# Patient Record
Sex: MALE | Race: WHITE
[De-identification: names, ages, dates, MRNs, and addresses within clinical notes are randomized per-mention and may not be internally consistent; named-entity substitution may affect disease eponyms.]

---

## 2017-02-23 ENCOUNTER — HOSPITAL ENCOUNTER (OUTPATIENT)
Dept: HOSPITAL 47 - RADPROMAIN | Age: 77
Discharge: HOME | End: 2017-02-23
Payer: MEDICARE

## 2017-02-23 DIAGNOSIS — C85.90: Primary | ICD-10-CM

## 2017-02-23 LAB
BUN SERPL-SCNC: 21 MG/DL (ref 9–20)
NON-AFRICAN AMERICAN GFR(MDRD): >60

## 2017-02-23 PROCEDURE — 82565 ASSAY OF CREATININE: CPT

## 2017-02-23 PROCEDURE — 74177 CT ABD & PELVIS W/CONTRAST: CPT

## 2017-02-23 PROCEDURE — 84520 ASSAY OF UREA NITROGEN: CPT

## 2017-02-23 PROCEDURE — 71260 CT THORAX DX C+: CPT

## 2017-02-23 NOTE — CT
EXAMINATION TYPE: CT ChestAbdPelvis w con

 

DATE OF EXAM: 2/23/2017 4:35 PM

 

COMPARISON: CT cap November 14, 2016.

 

HISTORY: Follow up Non-Hodgkins lymphoma

 

CT DLP: 2037 mGycm. Automated Exposure Control for Dose Reduction was Utilized.

 

 

CONTRAST: 

CT scan of the thorax, abdomen and pelvis is performed with oral and with IV Contrast, patient inject
ed with 100 mL of Omnipaque 300.

 

FINDINGS:

 

LUNGS: The lungs are grossly clear, there is no concerning parenchymal mass or nodule identified.   T
here is no pleural effusion or pneumothorax seen.  The tracheobronchial tree is patent.

 

MEDIASTINUM: There are no greater than 1 cm hilar or mediastinal lymph nodes.   No cardiomegaly is se
en.  There is stable tiny pericardial effusion. Coronary artery calcification is redemonstrated.

 

OTHER: There is stable left subclavian Mediport catheter. There is redemonstration of prominent right
 axillary lymph node measuring 12 x 7 mm on axial image 20 unchanged in size and appearance from prio
r exam. Mild fat stranding with small nodularity superior to this between pectoralis muscles on axial
 image 11 is stable. No new greater than 1 cm adenopathy is identified.

 

LIVER/GB: No significant abnormality is appreciated.

 

PANCREAS: No significant abnormality is seen.

 

SPLEEN: No significant abnormality is seen.

 

ADRENALS: No significant abnormality is seen.

 

KIDNEYS: Several simple appearing cysts are scattered throughout both kidneys unchanged from prior in
cluding largest cyst anteriorly upper pole level right kidney.

 

BOWEL: Slight redundancy of sigmoid colon is present. A few scattered diverticula are redemonstrated.
 No suspicious small or large bowel dilatation is seen.

 

GENITAL ORGANS: Heterogeneous slightly prominent prostate gland with central zone calcifications is r
edemonstrated and stable.

 

LYMPH NODES: No greater than 1cm abdominal or pelvic lymph nodes are appreciated. A few scattered sub
centimeter lymph nodes in bilateral groin and retroperitoneum are felt unchanged.

 

OSSEOUS STRUCTURES: Hemangioma formation L2 level is redemonstrated. Fairly moderate multilevel spurr
ing in the mid to lower thoracic spine is again seen.

 

OTHER: No significant additional abnormality is seen.

 

IMPRESSION: Overall stable findings, no new adenopathy is identified to suggest neoplastic recurrence
.

## 2017-05-24 ENCOUNTER — HOSPITAL ENCOUNTER (OUTPATIENT)
Dept: HOSPITAL 47 - RADPROMAIN | Age: 77
Discharge: HOME | End: 2017-05-24
Payer: MEDICARE

## 2017-05-24 DIAGNOSIS — K57.30: Primary | ICD-10-CM

## 2017-05-24 DIAGNOSIS — Z88.0: ICD-10-CM

## 2017-05-24 DIAGNOSIS — C85.98: ICD-10-CM

## 2017-05-24 LAB
BUN SERPL-SCNC: 25 MG/DL (ref 9–20)
NON-AFRICAN AMERICAN GFR(MDRD): 56

## 2017-05-24 PROCEDURE — 71260 CT THORAX DX C+: CPT

## 2017-05-24 PROCEDURE — 74177 CT ABD & PELVIS W/CONTRAST: CPT

## 2017-05-24 PROCEDURE — 84520 ASSAY OF UREA NITROGEN: CPT

## 2017-05-24 PROCEDURE — 82565 ASSAY OF CREATININE: CPT

## 2017-05-24 NOTE — CT
EXAMINATION TYPE: CT ChestAbdPelvis w con

 

DATE OF EXAM: 5/24/2017 3:47 PM

 

COMPARISON: Previous study dated 2/23/2017.

 

HISTORY: Lymphoma.

 

CT DLP: 1019.3 mGycm

Automated exposure control for dose reduction was used.

 

TECHNIQUE: Helical acquisition through the abdomen and pelvis was obtained without oral contrast but 
following the intravenous administration of 80 mL of Omnipaque 300.  The data was formatted in the ax
ial, coronal and sagittal projections.

 

FINDINGS: The lungs are clear.

 

There are some stable right axillary lymph nodes the largest measuring 7.9 mm. Previously this was me
asured at 7.5 mm. There is no mediastinal or hilar adenopathy. There is no pleural or pericardial flu
id. The heart is not enlarged. The aorta is normal in caliber. There is coronary artery and other vas
cular calcifications present.

 

Within the abdomen, the liver spleen and gallbladder are normal.

 

Both adrenal glands are normal. There are 3, simple appearing cyst in the right kidney and a solitary
, simple appearing cyst in the left kidney. These are unchanged from previous.

 

The pancreas is unremarkable.

 

There is no significant axillary, iliac or inguinal adenopathy.

 

The bladder is unremarkable.

 

There are scattered diverticula along the left side of the colon. There is no radiographic evidence o
f diverticulitis. The appendix is not visualized. Small bowel loops are normal.

 

There is no evidence of free fluid or free air.

 

There is hypertrophic spondylosis in the lower thoracic and lower lumbar spines. There is a hemangiom
a involving the L2 vertebral body, unchanged from previous. No bony destructive lesion is seen.

 

There is a Port-A-Cath in place via a left subclavian approach. Its tip is at the cavoatrial junction
.

 

IMPRESSION: 

1. NO PATHOLOGICALLY ENLARGED LYMPH NODES AND NO INTERVAL CHANGE IN THE PATIENT'S ADENOPATHY.

2. SIMPLE APPEARING, BILATERAL RENAL CYSTS.

3. MINIMAL, UNCOMPLICATED DIVERTICULOSIS OF THE LEFT SIDE OF THE COLON.

4. DEGENERATIVE CHANGES WITHIN THE SPINE.

## 2017-10-11 ENCOUNTER — HOSPITAL ENCOUNTER (OUTPATIENT)
Dept: HOSPITAL 47 - RADCTMAIN | Age: 77
Discharge: HOME | End: 2017-10-11
Payer: MEDICARE

## 2017-10-11 DIAGNOSIS — C85.98: Primary | ICD-10-CM

## 2017-10-11 DIAGNOSIS — Z88.0: ICD-10-CM

## 2017-10-11 LAB
BUN SERPL-SCNC: 23 MG/DL (ref 9–20)
NON-AFRICAN AMERICAN GFR(MDRD): >60

## 2017-10-11 PROCEDURE — 84520 ASSAY OF UREA NITROGEN: CPT

## 2017-10-11 PROCEDURE — 82565 ASSAY OF CREATININE: CPT

## 2017-10-11 PROCEDURE — 74177 CT ABD & PELVIS W/CONTRAST: CPT

## 2017-10-11 PROCEDURE — 71260 CT THORAX DX C+: CPT

## 2017-10-11 NOTE — CT
EXAMINATION TYPE: CT ChestAbdPelvis w con

 

DATE OF EXAM: 10/11/2017

 

COMPARISON: CT cap May 24, 2017 and older CTs. PET CT April 16, 2016.

 

HISTORY: Follow up to lymphoma

 

CT DLP: 1216 mGycm. Automated Exposure Control for Dose Reduction was Utilized.

 

 

CONTRAST: 

CT scan of the thorax, abdomen and pelvis is performed with IV Contrast, patient injected with 100 mL
 of Omnipaque 300.

 

FINDINGS:

 

LUNGS: The lungs are grossly clear, there is no concerning parenchymal mass or nodule identified.   T
here is no pleural effusion or pneumothorax seen.  The tracheobronchial tree is patent.

 

MEDIASTINUM: There are no greater than 1 cm hilar or mediastinal lymph nodes.   No pericardial effusi
on is seen.  No suspicious supraclavicular adenopathy is seen currently. There is stable 1.3 cm thyro
id isthmus nodule on axial image 5.

 

OTHER: Some prominent but subcentimeter right axillary lymph nodes remain present not significantly c
hanged from most recent prior study. There is stable left subclavian Mediport catheter with tip in SV
C.

 

LIVER/GB: No significant abnormality is appreciated.

 

PANCREAS: No significant abnormality is seen.

 

SPLEEN: No significant abnormality is seen.

 

ADRENALS: No significant abnormality is seen.

 

KIDNEYS: Several simple appearing cysts scattered throughout both kidneys are redemonstrated. Promine
nt retroperitoneal fat is redemonstrated surrounding both kidneys.

 

BOWEL: Some diverticula in left and sigmoid colon are redemonstrated. No suspicious bowel dilatation 
is seen. Oral contrast does not reach level of rectum.

 

GENITAL ORGANS: Central zone calcifications are seen in prostate gland upper limits of normal.

 

LYMPH NODES: No greater than 1cm abdominal or pelvic lymph nodes are appreciated particular attention
 to the retroperitoneum and iliac levels as well as left groin level at area of original adenopathy o
n PET/CT.

 

OSSEOUS STRUCTURES: Multilevel spurring throughout the thoracolumbar spine is redemonstrated. There i
s hemangioma at L2 vertebral body level redemonstrated.

 

OTHER: There is mild to moderate calcified atherosclerotic change of aorta extending into branch vess
els.

 

IMPRESSION: No suspicious new mass or adenopathy is seen to suggest neoplastic or lymphoma recurrence
.

## 2018-02-07 ENCOUNTER — HOSPITAL ENCOUNTER (OUTPATIENT)
Dept: HOSPITAL 47 - RADCTMAIN | Age: 78
Discharge: HOME | End: 2018-02-07
Payer: MEDICARE

## 2018-02-07 DIAGNOSIS — C85.98: Primary | ICD-10-CM

## 2018-02-07 DIAGNOSIS — Z88.0: ICD-10-CM

## 2018-02-07 LAB — BUN SERPL-SCNC: 22 MG/DL (ref 9–20)

## 2018-02-07 PROCEDURE — 82565 ASSAY OF CREATININE: CPT

## 2018-02-07 PROCEDURE — 84520 ASSAY OF UREA NITROGEN: CPT

## 2018-02-07 PROCEDURE — 71260 CT THORAX DX C+: CPT

## 2018-02-07 PROCEDURE — 74177 CT ABD & PELVIS W/CONTRAST: CPT

## 2018-02-07 NOTE — CT
EXAMINATION TYPE: CT ChestAbdPelvis w con

 

DATE OF EXAM: 2/7/2018

 

COMPARISON: CT chest abdomen and pelvis October 11, 2017 and older studies back through Danay 15, 2016
. PET CT April 16, 2016

 

HISTORY: Non-Hodgkins Lymphoma progress study. History of chemotherapy treatment dates not specified.


 

CT DLP: 1102.7 mGycm. Automated Exposure Control for Dose Reduction was Utilized.

 

 

CONTRAST: 

CT scan of the thorax, abdomen and pelvis is performed with oral and with IV Contrast, patient inject
ed with 100 mL of Omnipaque 300.

 

FINDINGS:

 

LUNGS: There is right anterior medial basilar linear scarring and/or atelectasis axial image 34. No s
uspicious parenchymal nodule or mass is present. No pleural effusion or pneumothorax is seen bilatera
lly. Tracheobronchial tree is patent.

 

MEDIASTINUM: There are no new greater than 1 cm hilar or mediastinal lymph nodes.  There is stable 5 
x 4 mm left paraesophageal lymph node axial image 23 at subcarinal level. No cardiomegaly is seen. Th
ere are stable tiny pericardial effusion. There is stable severe 3 vessel coronary artery calcificati
on which is noted marker for coronary artery disease

 

OTHER:  No suspicious recurrent axillary adenopathy. There are stable left subclavian Mediport cathet
er.

 

LIVER/GB: No significant abnormality is appreciated.

 

PANCREAS: No significant abnormality is seen.

 

SPLEEN: No significant abnormality is seen.

 

ADRENALS: No significant abnormality is seen.

 

KIDNEYS: There are a few simple appearing cysts scattered throughout both kidneys, right more numerou
s than left.

 

BOWEL: Some diverticula are seen in the left and sigmoid colon. There is some redundancy of sigmoid c
olon redemonstrated. There is no suspicious bowel dilatation

 

GENITAL ORGANS: Central zone calcifications are seen in slightly prominent prostate gland bulging on 
bladder base, underlying BPH is not excluded. No significant change from prior.

 

LYMPH NODES: No greater than 1cm abdominal or pelvic lymph nodes are appreciated. Stable subcentimete
r bilateral groin lymph nodes remain present.

 

OSSEOUS STRUCTURES: There is moderate multilevel spurring in the thoracic spine. There is hemangioma 
at L2 vertebral body level redemonstrated.

 

OTHER: There is mild to moderate calcified plaque of aorta extending into branch vessels redemonstrat
ed.

 

IMPRESSION: No new suspicious mass or adenopathy is seen to suggest neoplastic or lymphoma recurrence
. No significant change from most recent CT. Marked interval improvement or resolution of abnormal ad
enopathy above and below diaphragm since original PET/CT.

## 2018-06-11 ENCOUNTER — HOSPITAL ENCOUNTER (OUTPATIENT)
Dept: HOSPITAL 47 - RADCTMAIN | Age: 78
Discharge: HOME | End: 2018-06-11
Payer: MEDICARE

## 2018-06-11 DIAGNOSIS — C85.98: Primary | ICD-10-CM

## 2018-06-11 LAB — BUN SERPL-SCNC: 23 MG/DL (ref 9–20)

## 2018-06-11 PROCEDURE — 82565 ASSAY OF CREATININE: CPT

## 2018-06-11 PROCEDURE — 71260 CT THORAX DX C+: CPT

## 2018-06-11 PROCEDURE — 84520 ASSAY OF UREA NITROGEN: CPT

## 2018-06-11 PROCEDURE — 74177 CT ABD & PELVIS W/CONTRAST: CPT

## 2018-06-11 NOTE — CT
EXAMINATION TYPE: CT ChestAbdPelvis w con

 

DATE OF EXAM: 6/11/2018

 

COMPARISON: CT chest abdomen and pelvis February 7, 2018 and older studies. PET/CT April 16, 2016. HI
STORY: Lymphoma progress study.. Completed chemotherapy 8 months ago

 

CT DLP: 1646 mGycm. Automated Exposure Control for Dose Reduction was Utilized.

 

 

CONTRAST: 

CT scan of the thorax, abdomen and pelvis is performed with oral and with IV Contrast, patient inject
ed with 100 mL of Isovue 300.

 

FINDINGS:

 

LUNGS: The lungs are grossly clear, there is no concerning parenchymal mass or nodule identified.   T
here is no pleural effusion or pneumothorax seen.  The tracheobronchial tree is patent.

 

MEDIASTINUM: There are no greater than 1 cm hilar or mediastinal lymph nodes.  Stable prominent but s
ubcentimeter superior prevascular lymph nodes axial image 18 are noted. Stable tiny pericardial effus
ion is seen. No cardiomegaly is present. Fairly severe 3 vessel coronary artery calcification is agai
n seen which is noted marker for coronary artery disease. Multinodular thyroid is partially imaged fe
lt stable. 

 

OTHER: Prominent but subcentimeter right axillary lymph nodes axial image 17 are stable from most rec
ent prior. There is stable right subclavian Mediport catheter.

 

LIVER/GB: No significant abnormality is appreciated.

 

PANCREAS: No significant abnormality is seen.

 

SPLEEN: No significant abnormality is seen.

 

ADRENALS: No significant abnormality is seen.

 

KIDNEYS: Scattered simple appearing cysts throughout both kidneys more numerous and right kidney are 
redemonstrated.

 

BOWEL: Some diverticula in the distal colon is present.

 

GENITAL ORGANS: Central zone calcifications are seen in upper limits of normal in size prostate gland
.

 

LYMPH NODES: No greater than 1cm abdominal or pelvic lymph nodes are appreciated. Some subcentimeter 
retroperitoneal lymph nodes are not significantly changed from most recent CT.

 

OSSEOUS STRUCTURES: There is multilevel spurring in the mid to lower thoracic spine. There is mild to
 moderate joint space loss in both hips.

 

OTHER: There is mild to moderate calcified plaque of distal abdominal aorta extending into pelvic bra
nch vessels.

 

IMPRESSION: No new mass or adenopathy is seen to suggest neoplastic or lymphoma recurrence.  No signi
ficant change from most recent CT. Marked improvement from original PET/CT April 16, 2016 is once aga
in noted.

## 2019-01-04 ENCOUNTER — HOSPITAL ENCOUNTER (OUTPATIENT)
Dept: HOSPITAL 47 - RADPROMAIN | Age: 79
Discharge: HOME | End: 2019-01-04
Attending: INTERNAL MEDICINE
Payer: MEDICARE

## 2019-01-04 DIAGNOSIS — N28.1: ICD-10-CM

## 2019-01-04 DIAGNOSIS — C85.98: Primary | ICD-10-CM

## 2019-01-04 LAB — BUN SERPL-SCNC: 22 MG/DL (ref 9–20)

## 2019-01-04 PROCEDURE — 74177 CT ABD & PELVIS W/CONTRAST: CPT

## 2019-01-04 PROCEDURE — 84520 ASSAY OF UREA NITROGEN: CPT

## 2019-01-04 PROCEDURE — 36415 COLL VENOUS BLD VENIPUNCTURE: CPT

## 2019-01-04 PROCEDURE — 82565 ASSAY OF CREATININE: CPT

## 2019-01-04 PROCEDURE — 71260 CT THORAX DX C+: CPT

## 2019-01-04 NOTE — CT
EXAMINATION TYPE: CT ChestAbdPelvis w con

 

DATE OF EXAM: 1/4/2019

 

INDICATION: Lymphoma follow up.

 

COMPARISON: 6/11/2018

 

CT DLP: 1085.1 mGycm

 

CONTRAST: 

Performed with Oral Contrast and with IV Contrast, patient injected with 100ml mL of Isovue 300.

 

TECHNIQUE: Axial images at 5 mm thick sections.  Reconstructed images in the coronal plane.  Delayed 
images through the kidneys.  

 

FINDINGS:

 

CT CHEST:

 

Isthmus is increased in size measuring 1.2 cm with a hypodense center. This was present previously. T
hyroid otherwise appears within normal limits where visualized.  There is incompletely visualized.

 

No suspicious lung nodules or focal infiltrates are present.

 

No enlarged mediastinal or hilar adenopathy is evident. 

 

The ascending aorta diameter at the level of the main pulmonary artery is 3.8 cm.  The main pulmonary
 artery diameter at the bifurcation is 2.3 cm.

 

CT ABDOMEN:

 

Liver: Normal

 

Spleen: Normal

 

Pancreas: Normal

 

Adrenal glands: The adrenal glands are normal.

 

Gallbladder: Normal  

 

Kidneys: No masses are evident. No hydronephrosis is present.   There is a 4.6 cm cyst measuring 7 Ho
unsfield units on the superior anterior right upper kidney. A 3.6 cm cyst on the lateral right kidney
 measuring 5 Hounsfield units. There is a 1.6 cm cyst measuring 16 Hounsfield units on the posterior 
left kidney. There is a 1.6 cm cyst on the anterior inferior pole right kidney measuring 0 Hounsfield
 units. Tiny cortical renal cysts at the inferior pole right kidney.  Delayed images were obtained th
rough the kidneys, which remain unremarkable.

 

Aorta: Vascular calcification is within the aorta. 

 

Inferior vena cava: Normal.

 

CT PELVIS: 

Loops of bowel within the abdomen and pelvis are normal.     There are loops of bowel which are incom
pletely distended or lack oral contrast limiting their evaluation.

 

Appendix: Not visualized. No suspicious tubular structures or inflammatory changes are evident.

 

Urinary bladder: Normal. 

 

Genitourinary structures:

 

Osseous structures: No suspicious lytic or sclerotic lesions. 

 

Lymphadenopathy: Axillary regions appear normal. No enlarged mediastinal adenopathy is evident. Below
 the diaphragm no suspicious retrocrural or periaortic adenopathy is evident. No suspicious pelvic ad
enopathy is evident. Inguinal adenopathy is not evident.

 

IMPRESSIONS:

1. Multiple bilateral simple appearing renal cysts.

2. Hypodense thyroid nodule appears stable visualized.

3. No suspicious enlarged lymphadenopathy

## 2020-01-03 ENCOUNTER — HOSPITAL ENCOUNTER (OUTPATIENT)
Dept: HOSPITAL 47 - RADPROMAIN | Age: 80
Discharge: HOME | End: 2020-01-03
Attending: INTERNAL MEDICINE
Payer: MEDICARE

## 2020-01-03 DIAGNOSIS — E04.1: ICD-10-CM

## 2020-01-03 DIAGNOSIS — Q61.02: Primary | ICD-10-CM

## 2020-01-03 DIAGNOSIS — C85.98: ICD-10-CM

## 2020-01-03 DIAGNOSIS — Z88.0: ICD-10-CM

## 2020-01-03 LAB — BUN SERPL-SCNC: 24 MG/DL (ref 9–20)

## 2020-01-03 PROCEDURE — 74177 CT ABD & PELVIS W/CONTRAST: CPT

## 2020-01-03 PROCEDURE — 71260 CT THORAX DX C+: CPT

## 2020-01-03 PROCEDURE — 84520 ASSAY OF UREA NITROGEN: CPT

## 2020-01-03 PROCEDURE — 82565 ASSAY OF CREATININE: CPT

## 2020-01-04 NOTE — CT
EXAMINATION TYPE: CT ChestAbdPelvis w con

 

DATE OF EXAM: 1/3/2020

 

INDICATION: Lymphoma

 

COMPARISON: 70 3/20/2019

 

CT DLP: 1335.0 mGycm

 

CONTRAST: 

Performed with Oral Contrast and with IV Contrast, patient injected with 80 mL of Isovue 300.

 

TECHNIQUE: Axial images at 5 mm thick sections.  Reconstructed images in the coronal plane.  Delayed 
images through the kidneys.  

 

FINDINGS:

 

CT CHEST:

 

There is a nodule within the isthmus of the thyroid present previously. This is stable measuring 1.2 
cm.

 

No suspicious lung nodules or focal infiltrates are present. There is a very vague area of pneumoniti
s within the posterior lateral right lung, stable from prior study. Series 7 image 34a

 

There are scattered small lymph nodes adjacent to the aortic arch at the superior mediastinum. These 
were present previously. No enlarged mediastinal or hilar adenopathy is evident. Axillary regions fariha
ear clear.

 

The ascending aorta diameter at the level of the main pulmonary artery is 3.7 cm.  The main pulmonary
 artery diameter at the bifurcation is 2.7 cm. Some coronary artery calcification is noted.

 

CT ABDOMEN:

 

Liver: Normal

 

Spleen: Normal

 

Pancreas: Normal

 

Adrenal glands: The adrenal glands are normal.

 

Gallbladder: Normal  

 

Kidneys: No masses are evident. No hydronephrosis is present.   Multiple cysts are present on the rig
ht kidney. The largest along the lateral mid right kidney measures 4.3 cm 4 Hounsfield units. Superio
r pole cyst measures 4.8 cm and 12 Hounsfield unit. Additional small cortical renal cysts are present
 at the inferior poles bilaterally  Delayed images were obtained through the kidneys, which remain un
remarkable.

 

Aorta: Vascular calcification is within the aorta. 

 

Inferior vena cava: Normal.

 

CT PELVIS: 

Loops of bowel within the abdomen and pelvis are normal.     There are loops of bowel which are incom
pletely distended or lack oral contrast limiting their evaluation.

 

Appendix: Normal as visualized.

 

Urinary bladder: Normal. 

 

Genitourinary structures: Prostate is prominent.

 

Osseous structures: There is a small ring sclerosis within the left femoral neck. Sclerotic metastasi
s is not excluded this was present previously. Punctate sclerotic areas on the medial right iliac win
g. Facet degenerative changes are within the lumbar spine. 

 

Lymphadenopathy: No suspicious enlarged lymphadenopathy is evident. No suspicious shotty lymphadenopa
thy within the abdomen or pelvis is evident.

 

IMPRESSIONS:

1. No suspicious recurrent lymphoma.

2. Renal cysts.

3. Stable thyroid nodule isthmus of thyroid.

## 2020-07-01 ENCOUNTER — HOSPITAL ENCOUNTER (OUTPATIENT)
Dept: HOSPITAL 47 - RADPROMAIN | Age: 80
Discharge: HOME | End: 2020-07-01
Attending: INTERNAL MEDICINE
Payer: MEDICARE

## 2020-07-01 DIAGNOSIS — Z88.0: ICD-10-CM

## 2020-07-01 DIAGNOSIS — Z91.030: ICD-10-CM

## 2020-07-01 DIAGNOSIS — C85.98: Primary | ICD-10-CM

## 2020-07-01 LAB — BUN SERPL-SCNC: 26 MG/DL (ref 9–20)

## 2020-07-01 PROCEDURE — 82565 ASSAY OF CREATININE: CPT

## 2020-07-01 PROCEDURE — 71260 CT THORAX DX C+: CPT

## 2020-07-01 PROCEDURE — 84520 ASSAY OF UREA NITROGEN: CPT

## 2020-07-01 PROCEDURE — 74177 CT ABD & PELVIS W/CONTRAST: CPT

## 2020-07-01 NOTE — CT
EXAMINATION TYPE: CT ChestAbdPelvis w con

 

DATE OF EXAM: 7/1/2020

 

COMPARISON: Prior CT chest abdomen pelvis 1/3/2020

 

HISTORY: Lymphoma, non-Hodgkin's

 

CT DLP: 1673 mGycm

Automated exposure control for dose reduction was used.

 

CONTRAST: 

CT scan of the chest, abdomen and pelvis is performed with Oral Contrast and with IV Contrast, patien
t injected with 100 mL of Isovue 300.

 

FINDINGS:

 

LUNGS: The lungs are grossly clear, there is no concerning parenchymal mass or nodule identified.   T
here is no pleural effusion or pneumothorax seen.  The tracheobronchial tree is patent.

 

MEDIASTINUM: There are no greater than 1 cm hilar or mediastinal lymph nodes.   No pericardial effusi
on is seen.  

 

AORTA:  No significant abnormality is seen.

 

OTHER:  There are coronary artery calcifications.

 

LIVER/GB: No significant abnormality is appreciated.

 

PANCREAS: No significant abnormality is seen.

 

SPLEEN: No significant abnormality is seen.

 

ADRENALS: No significant abnormality is seen.

 

KIDNEYS: No significant interval change is seen.

 

REPRODUCTIVE ORGANS: No gross interval change seen.

 

BOWEL:  No significant interval change is seen.

 

FREE AIR: No Free Air visible.

 

ASCITES:  None seen.

 

RETROPERITONEAL ADENOPATHY:  No retroperitoneal adenopathy is seen.

 

LYMPH NODES: No greater than 1 cm abdominal or pelvic lymph nodes are appreciated.

 

URINARY BLADDER:  No significant interval change is seen.

 

PELVIC ADENOPATHY:  None visualized.

 

OSSEOUS STRUCTURES:  No significant abnormality is seen.

 

IMPRESSION: No significant interval change. Stable exam, no evident recurrence

## 2021-01-06 ENCOUNTER — HOSPITAL ENCOUNTER (OUTPATIENT)
Dept: HOSPITAL 47 - RADCTMAIN | Age: 81
Discharge: HOME | End: 2021-01-06
Attending: INTERNAL MEDICINE
Payer: MEDICARE

## 2021-01-06 DIAGNOSIS — C85.98: ICD-10-CM

## 2021-01-06 DIAGNOSIS — Z88.0: ICD-10-CM

## 2021-01-06 DIAGNOSIS — N40.0: ICD-10-CM

## 2021-01-06 DIAGNOSIS — N28.1: ICD-10-CM

## 2021-01-06 DIAGNOSIS — Z03.89: Primary | ICD-10-CM

## 2021-01-06 DIAGNOSIS — Z91.030: ICD-10-CM

## 2021-01-06 LAB — BUN SERPL-SCNC: 21 MG/DL (ref 9–20)

## 2021-01-06 PROCEDURE — 74177 CT ABD & PELVIS W/CONTRAST: CPT

## 2021-01-06 PROCEDURE — 71260 CT THORAX DX C+: CPT

## 2021-01-06 PROCEDURE — 84520 ASSAY OF UREA NITROGEN: CPT

## 2021-01-06 PROCEDURE — 82565 ASSAY OF CREATININE: CPT

## 2021-01-06 NOTE — CT
EXAMINATION TYPE: CT ChestAbdPelvis w con

 

DATE OF EXAM: 1/6/2021

 

INDICATION: follow up lymphoma

 

COMPARISON: 7/1/2020

 

CT DLP: 1281.8 mGycm

 

CONTRAST: 

Performed with Oral Contrast and with IV Contrast, patient injected with 100 mL of Isovue 300.

 

TECHNIQUE: Axial images at 5 mm thick sections.  Reconstructed images in the coronal plane.  Delayed 
images through the kidneys.  

 

FINDINGS:

 

CT CHEST:

 

Portion of the thyroid visualized is normal.

 

No suspicious lung nodules or focal infiltrates are present.

 

No enlarged mediastinal or hilar adenopathy is evident. 

 

The ascending aorta diameter at the level of the main pulmonary artery is 3.8 cm.  The main pulmonary
 artery diameter at the bifurcation is 2.5 cm. Artery calcification is present.

 

CT ABDOMEN:

 

Liver: Normal

 

Spleen: Normal

 

Pancreas: Normal

 

Adrenal glands: The adrenal glands are normal.

 

Gallbladder: Normal  

 

Kidneys: No masses are evident. No hydronephrosis is present.   There is a 5.0 cm cyst measuring 15 H
ounsfield units superior pole right kidney. Small cortical renal cyst on the superior left kidney daniel
suring 0.9 cm. Additional large cysts on the lateral right kidney measuring 4.5 cm and 7 Hounsfield u
nits. Smaller cortical renal cysts or on the posterior mid to inferior left kidney measuring 1.7 cm o
n the anterior right lower pole measuring 1.3 cm.  Delayed images were obtained through the kidneys, 
which remain unremarkable.

 

Aorta: Vascular calcification is within the aorta. 

 

Inferior vena cava: Normal.

 

CT PELVIS: 

Loops of bowel within the abdomen and pelvis are normal.     There are loops of bowel which are incom
pletely distended or lack oral contrast limiting their evaluation.

 

Appendix: Normal as visualized.

 

Urinary bladder: There may be some mild wall thickening of the anterior inferior urinary bladder. Pro
state impression is present 

 

Genitourinary structures: Prostate is somewhat prominent

 

Osseous structures: No suspicious lytic or sclerotic lesions. Facet degenerative changes are present.


 

No suspicious lymphadenopathy is evident. Axillary regions are clear. Superior mediastinum is normal.
 Mediastinum and hilar regions without adenopathy. Retrocrural region is normal. Periaortic and retro
caval regions are clear. Obturator canals and iliac chain lymphadenopathy is not evident. No enlarged
 inguinal adenopathy is evident.

 

IMPRESSIONS:

1. No suspicious lymphadenopathy.

2. Bilateral renal cysts.

3. Prostate hypertrophy

## 2021-07-07 ENCOUNTER — HOSPITAL ENCOUNTER (OUTPATIENT)
Age: 81
Discharge: HOME | End: 2021-07-07
Payer: MEDICARE

## 2021-07-07 PROCEDURE — 71260 CT THORAX DX C+: CPT

## 2021-07-07 PROCEDURE — 74177 CT ABD & PELVIS W/CONTRAST: CPT

## 2021-07-07 PROCEDURE — 84520 ASSAY OF UREA NITROGEN: CPT

## 2021-07-07 PROCEDURE — 82565 ASSAY OF CREATININE: CPT

## 2021-07-30 ENCOUNTER — HOSPITAL ENCOUNTER (OUTPATIENT)
Dept: HOSPITAL 47 - RADPETMAIN | Age: 81
Discharge: HOME | End: 2021-07-30
Attending: INTERNAL MEDICINE
Payer: MEDICARE

## 2021-07-30 DIAGNOSIS — C85.98: Primary | ICD-10-CM

## 2021-07-30 PROCEDURE — 78815 PET IMAGE W/CT SKULL-THIGH: CPT

## 2021-08-02 NOTE — PE
EXAMINATION TYPE: PET CT fusion skull to thigh

 

DATE OF EXAM: 7/30/2021

 

COMPARISON: Most recent CT July 7, 2021 and older CTs

 

HISTORY:  Non-Hodgkin's lymphoma diagnosed bilateral axillary 2015     completed chemotherapy 2016.

 

TECHNIQUE:  Following the intravenous administration of 10.89 mCi of F-18 FDG, whole body images are 
performed from the skull base to the midthigh.  Images are reviewed on the computer in the coronal, a
xial, and sagittal planes.  Reconstructed rotating images are created on independent workstation and 
reviewed on the computer.   A localization and attenuation correction CT is performed in conjunction 
with the PET scan. Blood glucose level equals 94.

 

SCAN: Subsequent Scan

 

FINDINGS: 

 

MEAN SUV MEDIASTINUM: 0.95

 

MEAN SUV LIVER: 1.84

 

SKULL BASE AND NECK:  No areas of abnormal hypermetabolic uptake.

 

CHEST, MEDIASTINUM, AND HILAR REGION: No areas of abnormal hypermetabolic uptake. No suspicious enlar
ged hypermetabolic axillary lymph nodes.

 

ABDOMEN AND PELVIS: Nonspecific diffuse bowel uptake. Normal excretion. No suspicious hypermetabolic 
lymph nodes.

 

OSSEOUS STRUCTURES: No areas of abnormal hypermetabolic uptake.

 

OTHER CT: Mild to moderate calcified plaque left greater than right carotid bulbs. Left subclavian Me
diport catheter terminating in SVC. Coronary artery calcification and/or stents.

 

Scattered benign thin-walled cysts throughout both kidneys redemonstrated. Occasional colonic diverti
cula.

 

IMPRESSION: No suspicious hypermetabolic masses or lymph nodes to suggest active lymphoma recurrence.

## 2021-08-23 ENCOUNTER — HOSPITAL ENCOUNTER (OUTPATIENT)
Dept: HOSPITAL 47 - OR | Age: 81
Discharge: HOME | End: 2021-08-23
Attending: SURGERY
Payer: MEDICARE

## 2021-08-23 VITALS — TEMPERATURE: 97.2 F

## 2021-08-23 VITALS — RESPIRATION RATE: 20 BRPM | HEART RATE: 69 BPM | DIASTOLIC BLOOD PRESSURE: 60 MMHG | SYSTOLIC BLOOD PRESSURE: 102 MMHG

## 2021-08-23 VITALS — BODY MASS INDEX: 27.5 KG/M2

## 2021-08-23 DIAGNOSIS — E78.5: ICD-10-CM

## 2021-08-23 DIAGNOSIS — Z85.72: ICD-10-CM

## 2021-08-23 DIAGNOSIS — Z79.84: ICD-10-CM

## 2021-08-23 DIAGNOSIS — Z88.8: ICD-10-CM

## 2021-08-23 DIAGNOSIS — Z79.82: ICD-10-CM

## 2021-08-23 DIAGNOSIS — Z87.891: ICD-10-CM

## 2021-08-23 DIAGNOSIS — I10: ICD-10-CM

## 2021-08-23 DIAGNOSIS — Z45.2: Primary | ICD-10-CM

## 2021-08-23 DIAGNOSIS — Z80.1: ICD-10-CM

## 2021-08-23 DIAGNOSIS — Z88.0: ICD-10-CM

## 2021-08-23 DIAGNOSIS — E11.9: ICD-10-CM

## 2021-08-23 LAB — GLUCOSE BLD-MCNC: 94 MG/DL (ref 75–99)

## 2021-08-23 PROCEDURE — 36590 REMOVAL TUNNELED CV CATH: CPT

## 2021-08-23 RX ADMIN — POTASSIUM CHLORIDE SCH MLS: 14.9 INJECTION, SOLUTION INTRAVENOUS at 08:15

## 2021-08-23 RX ADMIN — POTASSIUM CHLORIDE SCH MLS: 14.9 INJECTION, SOLUTION INTRAVENOUS at 08:40

## 2021-08-23 NOTE — P.GSHP
History of Present Illness


H&P Date: 08/23/21


Chief Complaint: History of lymphoma





This a 72-year-old male who presents today for removal of Port-A-Cath.  Patient 

appears history of lymphoma.  The Port-A-Cath was placed 6 years ago.





Past Medical History


Past Medical History: Cancer, Diabetes Mellitus, Hearing Disorder / Deafness, 

Hyperlipidemia, Hypertension


Additional Past Medical History / Comment(s): nonhodgkins lymphoma with chemo-

last chemo 2016


History of Any Multi-Drug Resistant Organisms: None Reported


Additional Past Surgical History / Comment(s): benign cyst removed from left 

chest in 1960s, pilondial cyst removed


Past Anesthesia/Blood Transfusion Reactions: Previous Problems w/ Anesthesia


Additional Past Anesthesia/Blood Transfusion Reaction / Comment(s): had a 

problem w/spinal in past-numbness lasted for 3 days before finally wearing off


Smoking Status: Former smoker





- Past Family History


  ** Father


Family Medical History: Cancer


Additional Family Medical History / Comment(s): lung cancer





  ** Brother(s)


Family Medical History: Cancer


Additional Family Medical History / Comment(s): lung cancer





Medications and Allergies


                                Home Medications











 Medication  Instructions  Recorded  Confirmed  Type


 


Aspirin [Adult Low Dose Aspirin EC] 81 mg PO DAILY 03/31/16 08/23/21 History


 


Cinnamon Bark [Cinnamon] 1,500 mg PO DAILY 03/31/16 08/23/21 History


 


Repaglinide [Prandin] 0.5 mg PO TID 03/31/16 08/23/21 History


 


atenoloL [Tenormin] 50 mg PO DAILY 03/31/16 08/23/21 History


 


metFORMIN HCL [Glucophage] 500 mg PO BID 03/31/16 08/23/21 History


 


Acetaminophen-Codeine 300-30mg 1 tab PO Q6H PRN 04/07/16 08/23/21 History





[Tylenol #3]    


 


Atorvastatin [Lipitor] 20 mg PO DAILY 08/18/21 08/23/21 History


 


Capramineral Whey 2 tbsp PO DAILY 08/18/21 08/23/21 History


 


Lisinopril-Hctz 20-12.5 mg 0.5 tab PO DAILY 08/18/21 08/23/21 History





[Zestoretic 20-12.5]    


 


Multivitamin [Multivitamins Adult 1 each PO DAILY 08/18/21 08/23/21 History





Gummies]    








                                    Allergies











Allergy/AdvReac Type Severity Reaction Status Date / Time


 


bee venom protein (honey bee) Allergy  Anaphylaxis Verified 08/23/21 07:52


 


Iodine and Iodide Containing Allergy  Nausea & Verified 08/23/21 07:52





Produc   Vomiting  


 


Penicillins Allergy  Rash/Hives, Verified 08/23/21 07:52





   swelling  














Surgical - Exam


                                   Vital Signs











Temp Pulse Resp BP Pulse Ox


 


 97.2 F L  69   16   177/77   99 


 


 08/23/21 07:41  08/23/21 07:41  08/23/21 07:41  08/23/21 07:41  08/23/21 07:41














- General


well developed, well nourished, no distress





- Eyes


PERRL





- ENT


normal pinna





- Neck


no masses





- Respiratory


normal expansion





- Cardiovascular


Rhythm: regular





- Abdomen


Abdomen: soft, non tender





Assessment and Plan


Plan: 





History of lymphoma.  Patient will have his left subclavian Port-A-Cath removed 

today.

## 2021-08-25 NOTE — P.OP
Date of Procedure: 08/25/21


Preoperative Diagnosis: 


History of lymphoma


Postoperative Diagnosis: 


History of lymphoma


Procedure(s) Performed: 


Removal of left subclavian Port-A-Cath


Anesthesia: MAC


Surgeon: Teja Mitchell


Pathology: none sent


Condition: stable


Disposition: PACU


Description of Procedure: 


Patient's placed the operative table in the supine position.  He received IV 

sedation.  His neck and chest was prepped and draped in sterile fashion.  The 

area the Port-A-Cath was injected with local Xylocaine.  A skin incision was 

made.  The Port-A-Cath was dissected free using cautery.  The catheter was 

removed intact.  The skin was closed interrupted 3-0 Monocryl suture.  Dermabond

dressings was applied.